# Patient Record
Sex: FEMALE | Race: WHITE | NOT HISPANIC OR LATINO | Employment: FULL TIME | URBAN - NONMETROPOLITAN AREA
[De-identification: names, ages, dates, MRNs, and addresses within clinical notes are randomized per-mention and may not be internally consistent; named-entity substitution may affect disease eponyms.]

---

## 2024-09-27 ENCOUNTER — OFFICE VISIT (OUTPATIENT)
Dept: URGENT CARE | Facility: CLINIC | Age: 44
End: 2024-09-27
Payer: COMMERCIAL

## 2024-09-27 VITALS
RESPIRATION RATE: 20 BRPM | DIASTOLIC BLOOD PRESSURE: 64 MMHG | SYSTOLIC BLOOD PRESSURE: 108 MMHG | TEMPERATURE: 98 F | HEART RATE: 65 BPM | OXYGEN SATURATION: 99 %

## 2024-09-27 DIAGNOSIS — K21.9 GASTROESOPHAGEAL REFLUX DISEASE WITHOUT ESOPHAGITIS: Primary | ICD-10-CM

## 2024-09-27 PROCEDURE — 99203 OFFICE O/P NEW LOW 30 MIN: CPT

## 2024-09-27 RX ORDER — OMEPRAZOLE 40 MG/1
40 CAPSULE, DELAYED RELEASE ORAL DAILY
Qty: 30 CAPSULE | Refills: 0 | Status: SHIPPED | OUTPATIENT
Start: 2024-09-27 | End: 2024-10-27

## 2024-09-27 NOTE — PATIENT INSTRUCTIONS
"Take prescribed medication as instructed.  Tylenol for pain or fever.  Avoid NSAIDs.  Avoid laying down after eating for at least 30 to 45 minutes.  Avoid caffeine, alcohol, spicy foods, citrus, chocolate, mint, as this may worsen symptoms.  Follow-up with GI referral as discussed.  Also follow-up with family doctor if no improvement.  Follow up with PCP in 3-5 days.  Proceed to  ER if symptoms worsen.    If tests are performed, our office will contact you with results only if changes need to made to the care plan discussed with you at the visit. You can review your full results on [facility].  Patient Education     Reflujo ácido y ERGE en adultos   Conceptos Básicos   Redactado por los médicos y editores de UpTo[employer]   ¿Qué es el reflujo? -- El reflujo se produce cuando el ácido que normalmente está en el estómago retrocede hasta el esófago (figura 1). El esófago es el tubo que transporta los alimentos desde la boca hasta el estómago.  Cuando el reflujo ocasiona síntomas molestos o daño, los médicos lo llaman \"enfermedad por reflujo gastroesofágico\" (\"ERGE\").  ¿Cuáles son los síntomas del reflujo? -- Los síntomas más comunes son:   Acidez estomacal, breanne sensación de ardor en el pecho   Regurgitación, que ocurre cuando el ácido y los alimentos no digeridos retroceden hacia la garganta o la boca  Otros síntomas podrían ser:   Dolor en el estómago o en el pecho   Dificultad para tragar   Voz áspera o dolor de garganta   Tos sin causa aparente   Náuseas o vómitos  ¿Hay algo que pueda hacer por mi cuenta para sentirme mejor? -- Sí. Quizá se sienta mejor si:   Baja de peso (si tiene sobrepeso).   Sparta el cabezal de la cama entre 6 y 8 pulgadas - Puede hacer esto colocando bloques de lacy debajo de dos patas de la cama o con un taco de espuma de poliestireno debajo del colchón. No es suficiente dormir con la edilson elevada sobre almohadas.   Edie los alimentos que empeoran kristin síntomas - Para algunas personas, " estos son el café, el chocolate, el alcohol, la menta y los alimentos grasosos, entre otros. Podría ser útil anotar lo que comió antes de tener reflujo. [place] puede ayudarlo a determinar si un alimento está causando el problema.   Meagan de fumar, si fuma. Schwab médico o enfermero puede ayudarlo a tratar de dejar de fumar.   Edie comer tarde - Recostarse con el estómago lleno puede empeorar el reflujo. Intente planificar kristin comidas para al menos 2 a 3 horas antes de la hora de acostarse.   Evite la ropa ajustada - Algunas personas se sienten mejor si usan ropa cómoda que no les presiona el área del estómago.  ¿Cómo se trata el reflujo? -- Existen varios tipos principales de medicinas que pueden ayudar con los síntomas del reflujo. Los más comunes son los antiácidos, los bloqueadores de histamina y los inhibidores de la bomba de protones (tabla 1). Todas estas medicinas reducen o bloquean el ácido del estómago, gilbert cada breanne de ellas lo hace de distinta manera.   Los antiácidos pueden ayudar con los síntomas leves, gilbert solo lo hacen por poco tiempo. Los bloqueadores de histamina son más marilynn que los antiácidos y schwab efecto es más duradero. Los antiácidos y la mayoría de los bloqueadores de histamina pueden comprarse sin receta.   Los inhibidores de la bomba de protones (IBP) son las medicinas más eficaces para los síntomas frecuentes y más intensos. Algunas de estas medicinas se venden sin receta, gilbert schwab médico puede recetarle otras versiones.  A veces, las medicinas cuestan menos si las compra con receta médica. Otras veces, las medicinas de venta sin receta cuestan menos. Si le preocupa el costo, pregunte a schwab farmacéutico sobre modos de abaratar kristin medicinas.  ¿Cuándo shahriar llamar al médico? -- Si jeronimo el dolor o ardor en el pecho puede ser un síntoma de reflujo, también puede ser un síntoma de algo más grave, mariann un problema cardíaco. Pida ayuda de emergencia de inmediato (en [place], karl reece 9-1-1) si  "valentin que podría estar teniendo un infarto.  Los síntomas de un infarto podrían ser, entre otros:   Dolor, presión o molestia roxann en el pecho y además:   Dificultad para respirar, sudoración, malestar estomacal o piel fría y sudorosa   Dolor en los brazos, la espalda o la mandíbula   Dolor más roxann al hacer actividades mariann subir las escaleras   Latidos acelerados o irregulares   Sensación de mareo, desmayo o debilidad  Algunas personas pueden controlar el reflujo por schwab cuenta simplemente al cambiar ju hábitos o lusi daniel medicinas de venta sin receta. Llame a schwab médico para pedir consejo si:   Ju síntomas son graves o figueredo mucho tiempo.   Le parece que no puede controlar ju síntomas.   Ha tenido síntomas indio muchos años.  También debe consultar al médico o enfermero de inmediato si:   Tiene dificultad para tragar o siente que la comida se \"atora\" al bajar   Baja de peso en forma involuntaria   Siente dolor en el pecho   Se atraganta al comer   Vomita chuck, o tiene evacuaciones bowen, negras o que parecen candelario  ¿Qué sucede si mi hijo pequeño o adolescente tiene reflujo? -- Si schwab hijo pequeño o adolescente tiene reflujo, llévelo a que lo les un médico o enfermero. No le dé a schwab hijo medicinas para tratar el reflujo sin consultar a un médico o enfermero.  En los niños, el reflujo puede tener múltiples causas. Pida a un médico o enfermero que verifique la causa antes de probar cualquier tratamiento.  Todos los artículos se actualizan a medida que se descubre nueva evidencia y culmina nuestro proceso de evaluación por homólogos   Rose Marie artículo se recuperó de UpToDate el: Mar 29, 2024.  Artículo 33273 Versión 13.0.es-419.1  Release: 32.2.4 - C32.87  © 2024 [employer] Todos los derechos reservados.  figura 1: Reflujo     El reflujo se produce cuando el ácido que normalmente está en el estómago retrocede hasta el esófago. [place] puede suceder si un músculo llamado \"esfínter esofágico inferior\" se relaja " demasiado.  [facility] 450643 Versión 1.0  tabla 1: Medicinas que reducen o bloquean el ácido estomacal  Tipo de medicina  Ejemplos de nombres de la medicina    Antiácidos* Carbonato de calcio (ejemplos de marcas comerciales: Maalox, Tums)    Hidróxido de aluminio, hidróxido de magnesio y simeticona (ejemplo de cindi comercial: Mylanta)   Agentes de superficie Sucralfato (cindi comercial: Carafate)   Bloqueadores de histamina¶  Famotidina (cindi comercial: Pepcid)    Cimetidina (cindi comercial: Tagamet)   Inhibidores de la bomba de protones Omeprazol (cindi comercial: Prilosec)    Esomeprazol (cindi comercial: Nexium)    Pantoprazol (cindi comercial: Protonix)    Lansoprazol (cindi comercial: Prevacid)    Dexlansoprazol (cindi comercial: Dexilant)    Rabeprazol (cindi comercial: AcipHex)   * Algunos antiácidos contienen aspirina, que puede aumentar el riesgo de sangrado interno. Algunos ejemplos de antiácidos con aspirina son Latasha-[place], Medi-Qu[place] y [place], gilbert también hay otros; por eso, es importante leer las etiquetas. Hable con schwab médico o enfermero antes de usar cualquier medicina que contenga aspirina.  ¶ Otro bloqueador de histamina, la ranitidina (cindi comercial: Zantac), se retiró del parra en 2020. Eso se debe a que se descubrió que a veces contiene breanne sustancia que puede incrementar el riesgo de cáncer si se usa en gran cantidad a lo bowen del tiempo. Si le queda algo de esta medicina en schwab casa, deje de usarla y deséchela. Pregunte a schwab médico o enfermero qué otra medicina debe usar en schwab lugar.  Gráfico 19215 Versión 15.0  Exención de responsabilidad y uso de la información del consumidor   Descargo de responsabilidad: esta información generalizada es un resumen limitado de información sobre el diagnóstico, el tratamiento y/o los medicamentos. No pretende ser exhaustiva y se debe utilizar mariann herramienta para ayudar al usuario a comprender y/o evaluar las posibles opciones de diagnóstico y  tratamiento. No incluye toda la información sobre afecciones, tratamientos, medicamentos, efectos secundarios o riesgos puedan ser aplicables a un paciente específico. No tiene el propósito de servir mariann recomendación médica ni de sustituir la recomendación médica, el diagnóstico o el tratamiento de un profesional de atención médica que se base en el examen y la evaluación de mckayla profesional de la sue respecto a las circunstancias específicas y únicas del paciente. Los pacientes deben hablar con un profesional de atención médica para obtener información completa sobre schwab sue, cuestiones médicas y opciones de tratamiento, incluidos los riesgos o los beneficios relacionados con el uso de medicamentos. Esta información no certifica que los tratamientos o medicamentos david seguros, eficaces o estén aprobados para tratar a un paciente específico. [employer] y kristin afiliados renuncian a cualquier garantía o responsabilidad relacionada con esta información o el uso de la misma.El uso de esta información está sujeto a las Condiciones de uso, disponibles en https://www.Wevebobuwer.com/en/know/clinical-effectiveness-terms. 2024© [employer] y kristin afiliados y/o licenciantes. Todos los derechos reservados.  Copyright   © 2024 [employer] Todos los derechos reservados.

## 2024-09-27 NOTE — PROGRESS NOTES
[facility]        NAME: Tana Feng is a 44 y.o. female  : 1980    MRN: [id number]  DATE: 2024  TIME: 6:53 PM    Assessment and Plan   Gastroesophageal reflux disease without esophagitis [K21.9]  1. Gastroesophageal reflux disease without esophagitis  Ambulatory Referral to Gastroenterology    omeprazole (PriLOSEC) 40 MG capsule        Patient was put on omeprazole 20 mg by PCP 2 weeks ago patient was only taking it as needed.  She is in no acute distress.  No significant abdominal tenderness.  Normal bowel sounds.  Has had normal bowel movements and denies any urinary symptoms.  Will increase dose to 40 mg and give referral to GI.  Patient has not followed up with PCP since being seen 2 weeks ago.    Patient Instructions     Take prescribed medication as instructed.  Tylenol for pain or fever.  Avoid NSAIDs.  Avoid laying down after eating for at least 30 to 45 minutes.  Avoid caffeine, alcohol, spicy foods, citrus, chocolate, mint, as this may worsen symptoms.  Follow-up with GI referral as discussed.  Also follow-up with family doctor if no improvement.  Follow up with PCP in 3-5 days.  Proceed to  ER if symptoms worsen.    If tests are performed, our office will contact you with results only if changes need to made to the care plan discussed with you at the visit. [facility]    Chief Complaint     Chief Complaint   Patient presents with    Abdominal Pain     Abd pain x 2 weeks.  Taking Omeprazole.  BM yesterday.  Passing urine without issue.  Pain increases with eating.  It worsens with lying down.           History of Present Illness       44-year-old female presents the clinic with upper and epigastric abdominal pain that is worse with eating and laying down.  Denies any prior GI history or surgeries.  Taking omeprazole 20 mg as prescribed by her family doctor daily 2 weeks ago-only taking it as needed.  Denies any pain with  urination or urinary frequency/urgency/hematuria.  No flank pain.  Had normal bowel movement yesterday.  Denies any other complaints at this time.  Denies any hematochezia/hematemesis.        Review of Systems   Review of Systems   Constitutional: Negative.    HENT: Negative.     Eyes: Negative.    Respiratory: Negative.     Cardiovascular: Negative.    Gastrointestinal:  Positive for abdominal pain. Negative for abdominal distention, blood in stool, constipation, diarrhea, nausea and vomiting.   Genitourinary: Negative.    Musculoskeletal: Negative.    Neurological:  Positive for dizziness. Negative for seizures, syncope, facial asymmetry, speech difficulty, weakness and headaches.         Current Medications       Current Outpatient Medications:     omeprazole (PriLOSEC) 20 mg delayed release capsule, Take 20 mg by mouth daily, Disp: , Rfl:     omeprazole (PriLOSEC) 40 MG capsule, Take 1 capsule (40 mg total) by mouth daily, Disp: 30 capsule, Rfl: 0    Current Allergies     Allergies as of 09/27/2024    (No Known Allergies)            The following portions of the patient's history were reviewed and updated as appropriate: allergies, current medications, past family history, past medical history, past social history, past surgical history and problem list.     History reviewed. No pertinent past medical history.    History reviewed. No pertinent surgical history.    No family history on file.      Medications have been verified.        Objective   /64 (BP Location: Right arm)   Pulse 65   Temp 98 °F (36.7 °C) (Skin)   Resp 20   LMP 09/27/2024   SpO2 99%        Physical Exam     Physical Exam  Constitutional:       General: She is not in acute distress.     Appearance: Normal appearance. She is not ill-appearing, toxic-appearing or diaphoretic.   HENT:      Head: Normocephalic and atraumatic.      Mouth/Throat:      Mouth: Mucous membranes are moist.      Pharynx: Oropharynx is clear. No posterior  oropharyngeal erythema.   Eyes:      Extraocular Movements: Extraocular movements intact.      Pupils: Pupils are equal, round, and reactive to light.   Cardiovascular:      Rate and Rhythm: Normal rate and regular rhythm.      Pulses: Normal pulses.      Heart sounds: Normal heart sounds.   Pulmonary:      Effort: Pulmonary effort is normal. No respiratory distress.      Breath sounds: Normal breath sounds. No stridor.   Abdominal:      General: Abdomen is flat. Bowel sounds are normal. There is no distension.      Palpations: Abdomen is soft. There is no mass.      Tenderness: There is no abdominal tenderness. There is no right CVA tenderness, left CVA tenderness, guarding or rebound.      Hernia: No hernia is present.   Skin:     General: Skin is warm and dry.      Capillary Refill: Capillary refill takes less than 2 seconds.      Findings: No erythema or rash.   Neurological:      Mental Status: She is alert and oriented to person, place, and time.   Psychiatric:         Mood and Affect: Mood normal.